# Patient Record
Sex: FEMALE | Race: WHITE | URBAN - METROPOLITAN AREA
[De-identification: names, ages, dates, MRNs, and addresses within clinical notes are randomized per-mention and may not be internally consistent; named-entity substitution may affect disease eponyms.]

---

## 2020-05-27 ENCOUNTER — TELEPHONE (OUTPATIENT)
Dept: INTERNAL MEDICINE CLINIC | Age: 20
End: 2020-05-27

## 2020-05-27 ENCOUNTER — VIRTUAL VISIT (OUTPATIENT)
Dept: INTERNAL MEDICINE CLINIC | Age: 20
End: 2020-05-27

## 2020-05-27 VITALS — WEIGHT: 155 LBS | HEIGHT: 59 IN | BODY MASS INDEX: 31.25 KG/M2

## 2020-05-27 DIAGNOSIS — E66.01 MORBIDLY OBESE (HCC): ICD-10-CM

## 2020-05-27 DIAGNOSIS — R03.0 ELEVATED BLOOD PRESSURE READING: ICD-10-CM

## 2020-05-27 DIAGNOSIS — Z71.89 EDUCATED ABOUT COVID-19 VIRUS INFECTION: ICD-10-CM

## 2020-05-27 DIAGNOSIS — Z00.00 HEALTH CARE MAINTENANCE: ICD-10-CM

## 2020-05-27 DIAGNOSIS — Z76.89 ENCOUNTER TO ESTABLISH CARE: Primary | ICD-10-CM

## 2020-05-27 DIAGNOSIS — N91.5 OLIGOMENORRHEA, UNSPECIFIED TYPE: ICD-10-CM

## 2020-05-27 NOTE — Clinical Note
Please call and schedule patient for 6 months follow up. They will not need fasting labs 1 week prior to appt. Thank you!

## 2020-05-27 NOTE — TELEPHONE ENCOUNTER
[address] [facility]   Please call and schedule patient for 6 months follow up. They will not need fasting labs 1 week prior to appt. Thank you!

## 2020-05-27 NOTE — PROGRESS NOTES
.  Internist of [id number]   [place], [address]  [phone] [facility]/[phone] fax      5/27/2020    Consent: Cele Ricci, who was seen by synchronous (real-time) audio-video technology, and/or her healthcare decision maker, is aware that this patient-initiated, Telehealth encounter on 5/27/2020 is a billable service, with coverage as determined by her insurance carrier. She is aware that she may receive a bill and has provided verbal consent to proceed: Yes. Patient: Cele Ricci, 2000, [id number]       Subjective:   Cele Ricci, a 23 y.o. female, who presents virtually to establish care. 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 Pt is unaware of her families health histories and was unable to answer any questions in reference to her family members. Current medications: none. Pt states she was on a BCP at one point but she experienced mood swings. Complaint: Was at [facility] on 5/19/2020 for right abdominal pain. Pain occurred when I walked or after eating. Was told it was muscular. Was also told my BP and blood sugars were elevated. Requested they send my records to [facility]. This is has resolved. Past Medical History:   Diagnosis Date    Elevated blood pressure reading 5/27/2020    Morbidly obese ([address] 5/27/2020    Oligomenorrhea 5/27/2020       No past surgical history on file.     Social History     Socioeconomic History    Marital status: SINGLE     Spouse name: Not on file    Number of children: Not on file    Years of education: Not on file    Highest education level: Not on file   Occupational History    Occupation: customer service     Comment: [address] [address] Financial resource strain: Not hard at all   Smackages insecurity     Worry: Never true     Inability: Never true   [employer] needs     Medical: No     Non-medical: No   Tobacco Use    Smoking status: Never Smoker    Smokeless tobacco: Never Used   Substance and Sexual Activity    Alcohol use: Never     Frequency: Never    Drug use: Never    Sexual activity: Yes     Partners: Male     Birth control/protection: Condom   Lifestyle    Physical activity     Days per week: 0 days     Minutes per session: 0 min    Stress: Not at all   Relationships    Social connections     Talks on phone: Not on file     Gets together: Not on file     Attends Scientologist service: Not on file     Active member of club or organization: Not on file     Attends meetings of clubs or organizations: Not on file     Relationship status: Not on file    Intimate partner violence     Fear of current or ex partner: Not on file     Emotionally abused: Not on file     Physically abused: Not on file     Forced sexual activity: Not on file   Other Topics Concern     Service No    Blood Transfusions No    Caffeine Concern No    Occupational Exposure No    Hobby Hazards No    Sleep Concern No    Stress Concern No    Weight Concern Yes    Special Diet No    Back Care No    Exercise No    Bike Helmet Yes    Seat Belt Yes    Self-Exams No   Social History Narrative    Not on file       Allergies no known allergies    No current outpatient medications on file prior to visit. No current facility-administered medications on file prior to visit. Objective:   Review of Systems   Constitutional: Negative. Was going to the gym until Covid appeared, no exercise in months. HENT: Negative. Eyes: Negative for blurred vision, double vision and photophobia. Have prescribed glasses but dont wear them. Respiratory: Negative for cough, shortness of breath and wheezing. Cardiovascular: Negative for chest pain, palpitations and leg swelling. Had high BP reading while at [facility], cant remember the numbers.     Gastrointestinal: Negative for abdominal pain, heartburn, nausea and vomiting. Genitourinary: Negative for dysuria, frequency and hematuria. Have not had a period in about a year due to irregularity. Have a GYN appt on 6/1/2020. Dont know name of provider. Sexually active, use condoms. Musculoskeletal: Negative for myalgias. Skin: Negative. Neurological: Negative for dizziness, seizures, weakness and headaches. Endo/Heme/Allergies: Negative for environmental allergies. Does not bruise/bleed easily. Psychiatric/Behavioral: Negative for depression, substance abuse and suicidal ideas. The patient is not nervous/anxious and does not have insomnia. Vital Signs: (As obtained by patient/caregiver at home)  There were no vitals taken for this visit. PHYSICAL EXAMINATION:  [ INSTRUCTIONS:  \"[x]\" Indicates a positive item  \"[]\" Indicates a negative item  -- DELETE ALL ITEMS NOT EXAMINED]      Constitutional: [x] Appears well-developed and well-nourished [x] No apparent distress. Obese habitus.       [] Abnormal -     Mental status: [x] Alert and awake  [x] Oriented to person/place/time [x] Able to follow commands    [] Abnormal -     Eyes:   EOM    [x]  Normal    [] Abnormal -   Sclera  [x]  Normal    [] Abnormal -          Discharge [x]  None visible   [] Abnormal -     HENT, Neck: [x] Normocephalic, atraumatic  [] Abnormal -   [x] Mouth/Throat: Mucous membranes are moist    Pulmonary/Chest: [x] Respiratory effort normal   [x] No visualized signs of difficulty breathing or respiratory distress        [] Abnormal -      Musculoskeletal:   [x] Normal gait with no signs of ataxia         [x] Normal range of motion of neck        [] Abnormal -     Neurological:        [x] No Facial Asymmetry (Cranial nerve 7 motor function) (limited exam due to video visit)          [x] No gaze palsy        [] Abnormal -          Skin:        [x] No significant exanthematous lesions or discoloration noted on facial skin         [] Abnormal -            Psychiatric:       [x] Normal Affect [] Abnormal -        [x] No Hallucinations    Other pertinent observable physical exam findings:-      Assessment:    Stephanie Macedo who has risk factors including (see above previous medical hx) and:       ICD-10-CM ICD-9-CM    1. Encounter to establish care Z76.89 V65.8    2. Oligomenorrhea, unspecified type N91.5 626.1    3. Morbidly obese (HCC) E66.01 278.01    4. Elevated blood pressure reading R03.0 796.2    5. Educated about COVID-19 virus infection Z71.89 V65.49    6. Health care maintenance Z00.00 V70.0        1. Encounter to establish care  Pt is a 23 y.o. female, who presents virtually to establish care. She has not been seen by a provider in some years now due to lack of health insurance. 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 Pt is unaware of her families health histories and was unable to answer any questions in reference to her family members. Current medications: none. Pt was on a BCP at one point for her oligomenorrhea but she experienced mood swings and had to stop the med. Complaint: Was at [facility] on 5/19/2020 for right abdominal pain. Pain occurred when I walked or after eating. Was told it was muscular. Was also told my BP and blood sugars were elevated. Requested they send my records to [facility]. This is has since resolved. 2. Oligomenorrhea, unspecified type  GYN appt on 6/1/2020. Will send referral if [facility] insurance requires. Must obtain name and address of GYN as pt did not know this info. 3. Morbidly obese (HCC)  Weight management:  BMI is out of normal parameters and plan is as follows: Discussed in great detail on diet, portion control, exercise, avoiding foods high in sugar, carbs and starches, fatty/greasy foods and to eat until satisfied not til full. I have counseled this patient on diet and exercise regimens as well. Patient verbalized understanding.      4. Elevated blood pressure reading  Encouraged pt to check her BP twice a day, if possible, and call the office next week with readings. Patient verbalized understanding. 5. Educated about COVID-19 virus infection  Instructed pt on how to protect themselves and others from covid ie wear face masks when leaving home, sanitize/wash hands frequently, avoid touching face, cough and sneeze into their elbow. Follow recommendations of their local and state health departments. Patient verbalized understanding. 6. Health care maintenance  Pt has an appt with GYN on 6/1/2020 for pap smear. She is unaware if she received the HPV vaccines. Verbal discussion of all information discussed above, pending tests ordered and future goals/plans completed. Patient expressed understanding of current diagnosis, planned testing, follow up and if needed to contact the office for any questions or concerns prior to the next visit. Plan:       Labs needed this week: No      No orders of the defined types were placed in this encounter. Follow-up and Dispositions    · Return in about 6 months (around 11/27/2020) for Follow up. [id number]   Please call and schedule patient for 6 months follow up. They will not need fasting labs 1 week prior to appt. Thank you! [address] Nurses  Placed referral and other orders: No      Dr. Fabio Rojas. Jamar, EVELIN, [employer]  Internists of [facility] spent at least 20 minutes with this new patient, and >50% of the time was spent counseling and/or coordinating care regarding oligomenorrhea, obesity, elevated BP reading, and health maintenance.

## 2021-09-29 ENCOUNTER — OFFICE VISIT (OUTPATIENT)
Dept: INTERNAL MEDICINE CLINIC | Age: 21
End: 2021-09-29
Payer: MEDICAID

## 2021-09-29 ENCOUNTER — HOSPITAL ENCOUNTER (OUTPATIENT)
Dept: LAB | Age: 21
Discharge: HOME | End: 2021-09-29
Payer: MEDICAID

## 2021-09-29 VITALS
SYSTOLIC BLOOD PRESSURE: 144 MMHG | HEART RATE: 78 BPM | DIASTOLIC BLOOD PRESSURE: 95 MMHG | WEIGHT: 151.8 LBS | TEMPERATURE: 97.7 F | BODY MASS INDEX: 32.75 KG/M2 | HEIGHT: 57 IN | RESPIRATION RATE: 16 BRPM | OXYGEN SATURATION: 96 %

## 2021-09-29 DIAGNOSIS — Z28.21 INFLUENZA VACCINATION DECLINED: ICD-10-CM

## 2021-09-29 DIAGNOSIS — E78.5 DYSLIPIDEMIA: ICD-10-CM

## 2021-09-29 DIAGNOSIS — N91.5 OLIGOMENORRHEA, UNSPECIFIED TYPE: ICD-10-CM

## 2021-09-29 DIAGNOSIS — I10 ESSENTIAL HYPERTENSION: ICD-10-CM

## 2021-09-29 DIAGNOSIS — I10 ESSENTIAL HYPERTENSION: Primary | ICD-10-CM

## 2021-09-29 DIAGNOSIS — Z63.79 STRESSFUL LIFE EVENT AFFECTING FAMILY: ICD-10-CM

## 2021-09-29 DIAGNOSIS — Z86.39 HISTORY OF ELEVATED GLUCOSE: ICD-10-CM

## 2021-09-29 DIAGNOSIS — Z86.16 PERSONAL HISTORY OF COVID-19: ICD-10-CM

## 2021-09-29 DIAGNOSIS — E66.01 MORBIDLY OBESE (HCC): ICD-10-CM

## 2021-09-29 DIAGNOSIS — Z28.21 COVID-19 VACCINATION DECLINED: ICD-10-CM

## 2021-09-29 PROBLEM — R03.0 ELEVATED BLOOD PRESSURE READING: Status: RESOLVED | Noted: 2020-05-27 | Resolved: 2021-09-29

## 2021-09-29 LAB
ALBUMIN SERPL-MCNC: 4 G/DL (ref 3.4–5)
ALBUMIN/GLOB SERPL: 1.1 {RATIO} (ref 0.8–1.7)
ALP SERPL-CCNC: 81 U/L (ref 45–117)
ALT SERPL-CCNC: 36 U/L (ref 13–56)
ANION GAP SERPL CALC-SCNC: 8 MMOL/L (ref 3–18)
AST SERPL-CCNC: 17 U/L (ref 10–38)
BILIRUB SERPL-MCNC: 0.4 MG/DL (ref 0.2–1)
BUN SERPL-MCNC: 11 MG/DL (ref 7–18)
BUN/CREAT SERPL: 21 (ref 12–20)
CALCIUM SERPL-MCNC: 9.7 MG/DL (ref 8.5–10.1)
CHLORIDE SERPL-SCNC: 105 MMOL/L (ref 100–111)
CHOLEST SERPL-MCNC: 236 MG/DL
CO2 SERPL-SCNC: 26 MMOL/L (ref 21–32)
CREAT SERPL-MCNC: 0.53 MG/DL (ref 0.6–1.3)
EST. AVERAGE GLUCOSE BLD GHB EST-MCNC: 137 MG/DL
GLOBULIN SER CALC-MCNC: 3.6 G/DL (ref 2–4)
GLUCOSE SERPL-MCNC: 111 MG/DL (ref 74–99)
HBA1C MFR BLD: 6.4 % (ref 4.2–5.6)
HDLC SERPL-MCNC: 38 MG/DL (ref 40–60)
HDLC SERPL: 6.2 {RATIO} (ref 0–5)
LDLC SERPL CALC-MCNC: 145.8 MG/DL (ref 0–100)
LIPID PROFILE,FLP: ABNORMAL
POTASSIUM SERPL-SCNC: 4 MMOL/L (ref 3.5–5.5)
PROT SERPL-MCNC: 7.6 G/DL (ref 6.4–8.2)
SODIUM SERPL-SCNC: 139 MMOL/L (ref 136–145)
TRIGL SERPL-MCNC: 261 MG/DL (ref ?–150)
VLDLC SERPL CALC-MCNC: 52.2 MG/DL

## 2021-09-29 PROCEDURE — 99214 OFFICE O/P EST MOD 30 MIN: CPT | Performed by: NURSE PRACTITIONER

## 2021-09-29 PROCEDURE — 83036 HEMOGLOBIN GLYCOSYLATED A1C: CPT

## 2021-09-29 PROCEDURE — 80061 LIPID PANEL: CPT

## 2021-09-29 PROCEDURE — 36415 COLL VENOUS BLD VENIPUNCTURE: CPT

## 2021-09-29 PROCEDURE — 80053 COMPREHEN METABOLIC PANEL: CPT

## 2021-09-29 RX ORDER — HYDROCHLOROTHIAZIDE 12.5 MG/1
12.5 TABLET ORAL DAILY
Qty: 90 TABLET | Refills: 0 | Status: SHIPPED | OUTPATIENT
Start: 2021-09-29 | End: 2022-03-09 | Stop reason: SDDI
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
## 2021-09-29 NOTE — PROGRESS NOTES
Chief Complaint   Patient presents with    Follow-up       1. Have you been to the ER, urgent care clinic since your last visit? Hospitalized since your last visit? No    2. Have you seen or consulted any other health care providers outside of the [address] [place] [address] since your last visit? Include any pap smears or colon screening. Yes.

## 2021-09-30 DIAGNOSIS — R73.03 PREDIABETES: Primary | ICD-10-CM

## 2021-09-30 DIAGNOSIS — E78.2 MIXED HYPERLIPIDEMIA: ICD-10-CM

## 2021-10-01 ENCOUNTER — TELEPHONE (OUTPATIENT)
Dept: INTERNAL MEDICINE CLINIC | Age: 21
End: 2021-10-01

## 2021-10-01 NOTE — TELEPHONE ENCOUNTER
----- Message from Tiburcio Fisher DNP sent at 9/30/2021 10:19 PM EDT -----  [facility] nurses: Please inform patient notify lab results  1.  -goal less than 150; -goal less than 70. Educated patient to reduce fried fatty or oily foods. Increase cardio exercise for weight loss. 2. CMP okay  3. A1c 6.4. This is considered prediabetes. A1c is 6.5 confirms diabetes. Reduce carbs and sweets in diet. Weight loss is very important to reduce A1c. Will reassess level in 3 months. (POC A1c)    [facility] front office: Patient was scheduled for Dr. Farshad Bond in December. Please cancel that appointment and reschedule under my schedule.   Thank you

## 2021-10-01 NOTE — PROGRESS NOTES
[facility] nurses: Please inform patient notify lab results  1.  -goal less than 150; -goal less than 70. Educated patient to reduce fried fatty or oily foods. Increase cardio exercise for weight loss. 2. CMP okay  3. A1c 6.4. This is considered prediabetes. A1c is 6.5 confirms diabetes. Reduce carbs and sweets in diet. Weight loss is very important to reduce A1c. Will reassess level in 3 months. (POC A1c)    [facility] front office: Patient was scheduled for Dr. Aries Julien in December. Please cancel that appointment and reschedule under my schedule.   Thank you

## 2021-10-04 NOTE — TELEPHONE ENCOUNTER
Patient reached and made aware of labs results and message per Dr. Elisa Daly. Patient verbalized understanding.

## 2022-03-06 LAB — SARS-COV-2, NAA: NOT DETECTED

## 2022-03-09 ENCOUNTER — OFFICE VISIT (OUTPATIENT)
Dept: INTERNAL MEDICINE CLINIC | Age: 22
End: 2022-03-09
Payer: MEDICAID

## 2022-03-09 VITALS
SYSTOLIC BLOOD PRESSURE: 149 MMHG | HEIGHT: 57 IN | WEIGHT: 153 LBS | DIASTOLIC BLOOD PRESSURE: 101 MMHG | RESPIRATION RATE: 16 BRPM | TEMPERATURE: 96.9 F | OXYGEN SATURATION: 97 % | BODY MASS INDEX: 33.01 KG/M2 | HEART RATE: 93 BPM

## 2022-03-09 DIAGNOSIS — I10 ESSENTIAL HYPERTENSION: Primary | ICD-10-CM

## 2022-03-09 DIAGNOSIS — F41.9 ANXIETY AND DEPRESSION: ICD-10-CM

## 2022-03-09 DIAGNOSIS — E66.01 MORBIDLY OBESE (HCC): ICD-10-CM

## 2022-03-09 DIAGNOSIS — F43.9 STRESS AT HOME: ICD-10-CM

## 2022-03-09 DIAGNOSIS — E11.65 TYPE 2 DIABETES MELLITUS WITH HYPERGLYCEMIA, WITHOUT LONG-TERM CURRENT USE OF INSULIN (HCC): ICD-10-CM

## 2022-03-09 DIAGNOSIS — F32.A ANXIETY AND DEPRESSION: ICD-10-CM

## 2022-03-09 DIAGNOSIS — E78.5 DYSLIPIDEMIA: ICD-10-CM

## 2022-03-09 PROCEDURE — 99214 OFFICE O/P EST MOD 30 MIN: CPT | Performed by: NURSE PRACTITIONER

## 2022-03-09 RX ORDER — LISINOPRIL 20 MG/1
20 TABLET ORAL DAILY
Qty: 90 TABLET | Refills: 1 | Status: SHIPPED | OUTPATIENT
Start: 2022-03-09 | End: 2022-08-30 | Stop reason: SDUPTHER

## 2022-03-09 RX ORDER — IPRATROPIUM BROMIDE 21 UG/1
SPRAY, METERED NASAL
COMMUNITY
Start: 2022-03-04

## 2022-03-09 RX ORDER — SERTRALINE HYDROCHLORIDE 50 MG/1
50 TABLET, FILM COATED ORAL DAILY
Qty: 90 TABLET | Refills: 1 | Status: SHIPPED | OUTPATIENT
Start: 2022-03-09 | End: 2022-08-30 | Stop reason: SDUPTHER

## 2022-03-09 RX ORDER — FLUTICASONE PROPIONATE 50 MCG
SPRAY, SUSPENSION (ML) NASAL
COMMUNITY
Start: 2022-03-04

## 2022-03-09 NOTE — PROGRESS NOTES
Chief Complaint   Patient presents with    Follow-up     3 months     1. \"Have you been to the ER, urgent care clinic since your last visit? Hospitalized since your last visit? \" yes, PF for allergy issue. 2. \"Have you seen or consulted any other health care providers outside of the [address] [place] [address] since your last visit? \" No     3. For patients aged 39-70: Has the patient had a colonoscopy / FIT/ Cologuard? NA - based on age      If the patient is female:    4. For patients aged 41-77: Has the patient had a mammogram within the past 2 years? NA - based on age or sex      11. For patients aged 21-65: Has the patient had a pap smear? Yes - Care Gap present.  Most recent result on file
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
## 2022-03-10 PROBLEM — F43.9 STRESS AT HOME: Status: ACTIVE | Noted: 2022-03-10

## 2022-03-11 PROBLEM — F41.9 ANXIETY AND DEPRESSION: Status: ACTIVE | Noted: 2022-03-11

## 2022-03-11 PROBLEM — E11.65 TYPE 2 DIABETES MELLITUS WITH HYPERGLYCEMIA, WITHOUT LONG-TERM CURRENT USE OF INSULIN (HCC): Status: ACTIVE | Noted: 2022-03-11

## 2022-03-11 PROBLEM — F32.A ANXIETY AND DEPRESSION: Status: ACTIVE | Noted: 2022-03-11

## 2022-03-18 PROBLEM — E66.01 MORBIDLY OBESE (HCC): Status: ACTIVE | Noted: 2020-05-27

## 2022-03-18 PROBLEM — F41.9 ANXIETY AND DEPRESSION: Status: ACTIVE | Noted: 2022-03-11

## 2022-03-18 PROBLEM — E78.2 MIXED HYPERLIPIDEMIA: Status: ACTIVE | Noted: 2021-09-30

## 2022-03-18 PROBLEM — F32.A ANXIETY AND DEPRESSION: Status: ACTIVE | Noted: 2022-03-11

## 2022-03-19 PROBLEM — N91.5 OLIGOMENORRHEA: Status: ACTIVE | Noted: 2020-05-27

## 2022-03-19 PROBLEM — E11.65 TYPE 2 DIABETES MELLITUS WITH HYPERGLYCEMIA, WITHOUT LONG-TERM CURRENT USE OF INSULIN (HCC): Status: ACTIVE | Noted: 2022-03-11

## 2022-03-19 PROBLEM — Z28.21 COVID-19 VACCINATION DECLINED: Status: ACTIVE | Noted: 2021-09-29

## 2022-03-19 PROBLEM — I10 ESSENTIAL HYPERTENSION: Status: ACTIVE | Noted: 2021-09-29

## 2022-03-19 PROBLEM — E78.5 DYSLIPIDEMIA: Status: ACTIVE | Noted: 2021-09-29

## 2022-03-19 PROBLEM — Z28.21 INFLUENZA VACCINATION DECLINED: Status: ACTIVE | Noted: 2021-09-29

## 2022-03-19 PROBLEM — Z86.16 PERSONAL HISTORY OF COVID-19: Status: ACTIVE | Noted: 2021-09-29

## 2022-03-19 PROBLEM — Z86.39 HISTORY OF ELEVATED GLUCOSE: Status: ACTIVE | Noted: 2021-09-29

## 2022-03-19 PROBLEM — Z63.79 STRESSFUL LIFE EVENT AFFECTING FAMILY: Status: ACTIVE | Noted: 2021-09-29

## 2022-03-19 PROBLEM — R73.03 PREDIABETES: Status: ACTIVE | Noted: 2021-09-30

## 2022-03-20 PROBLEM — F43.9 STRESS AT HOME: Status: ACTIVE | Noted: 2022-03-10

## 2022-08-17 ENCOUNTER — APPOINTMENT (OUTPATIENT)
Dept: INTERNAL MEDICINE CLINIC | Age: 22
End: 2022-08-17

## 2022-08-17 ENCOUNTER — HOSPITAL ENCOUNTER (OUTPATIENT)
Dept: LAB | Age: 22
Discharge: HOME | End: 2022-08-17
Payer: MEDICAID

## 2022-08-17 DIAGNOSIS — F41.9 ANXIETY AND DEPRESSION: ICD-10-CM

## 2022-08-17 DIAGNOSIS — I10 ESSENTIAL HYPERTENSION: ICD-10-CM

## 2022-08-17 DIAGNOSIS — F32.A ANXIETY AND DEPRESSION: ICD-10-CM

## 2022-08-17 DIAGNOSIS — E11.65 TYPE 2 DIABETES MELLITUS WITH HYPERGLYCEMIA, WITHOUT LONG-TERM CURRENT USE OF INSULIN (HCC): ICD-10-CM

## 2022-08-17 DIAGNOSIS — E78.5 DYSLIPIDEMIA: ICD-10-CM

## 2022-08-17 LAB
ALBUMIN SERPL-MCNC: 3.9 G/DL (ref 3.4–5)
ALBUMIN/GLOB SERPL: 1.1 {RATIO} (ref 0.8–1.7)
ALP SERPL-CCNC: 53 U/L (ref 45–117)
ALT SERPL-CCNC: 22 U/L (ref 13–56)
ANION GAP SERPL CALC-SCNC: 6 MMOL/L (ref 3–18)
AST SERPL-CCNC: 12 U/L (ref 10–38)
BILIRUB SERPL-MCNC: 0.4 MG/DL (ref 0.2–1)
BUN SERPL-MCNC: 11 MG/DL (ref 7–18)
BUN/CREAT SERPL: 19 (ref 12–20)
CALCIUM SERPL-MCNC: 8.9 MG/DL (ref 8.5–10.1)
CHLORIDE SERPL-SCNC: 106 MMOL/L (ref 100–111)
CHOLEST SERPL-MCNC: 250 MG/DL
CO2 SERPL-SCNC: 26 MMOL/L (ref 21–32)
CREAT SERPL-MCNC: 0.57 MG/DL (ref 0.6–1.3)
CREAT UR-MCNC: <13 MG/DL (ref 30–125)
EST. AVERAGE GLUCOSE BLD GHB EST-MCNC: 146 MG/DL
GLOBULIN SER CALC-MCNC: 3.7 G/DL (ref 2–4)
GLUCOSE SERPL-MCNC: 88 MG/DL (ref 74–99)
HBA1C MFR BLD: 6.7 % (ref 4.2–5.6)
HCG UR QL: NEGATIVE
HDLC SERPL-MCNC: 42 MG/DL (ref 40–60)
HDLC SERPL: 6 {RATIO} (ref 0–5)
LDLC SERPL CALC-MCNC: 156.6 MG/DL (ref 0–100)
LIPID PROFILE,FLP: ABNORMAL
MICROALBUMIN UR-MCNC: <0.5 MG/DL (ref 0–3)
MICROALBUMIN/CREAT UR-RTO: ABNORMAL MG/G (ref 0–30)
POTASSIUM SERPL-SCNC: 4.1 MMOL/L (ref 3.5–5.5)
PROT SERPL-MCNC: 7.6 G/DL (ref 6.4–8.2)
SODIUM SERPL-SCNC: 138 MMOL/L (ref 136–145)
TRIGL SERPL-MCNC: 257 MG/DL (ref ?–150)
TSH SERPL DL<=0.05 MIU/L-ACNC: 3.76 UIU/ML (ref 0.36–3.74)
VLDLC SERPL CALC-MCNC: 51.4 MG/DL

## 2022-08-17 PROCEDURE — 81025 URINE PREGNANCY TEST: CPT

## 2022-08-17 PROCEDURE — 36415 COLL VENOUS BLD VENIPUNCTURE: CPT

## 2022-08-17 PROCEDURE — 82043 UR ALBUMIN QUANTITATIVE: CPT

## 2022-08-17 PROCEDURE — 80061 LIPID PANEL: CPT

## 2022-08-17 PROCEDURE — 84443 ASSAY THYROID STIM HORMONE: CPT

## 2022-08-17 PROCEDURE — 80053 COMPREHEN METABOLIC PANEL: CPT

## 2022-08-17 PROCEDURE — 83036 HEMOGLOBIN GLYCOSYLATED A1C: CPT

## 2022-08-19 ENCOUNTER — TELEPHONE (OUTPATIENT)
Dept: INTERNAL MEDICINE CLINIC | Age: 22
End: 2022-08-19

## 2022-08-19 NOTE — TELEPHONE ENCOUNTER
----- Message from Monica Pelayo sent at 8/18/2022  1:05 PM EDT -----  Subject: Appointment Request    Reason for Call: Established Patient Appointment needed: Routine Existing   Condition Follow Up    QUESTIONS    Reason for appointment request? Available appointments did not meet   patient need     Additional Information for Provider? Pt needs to reschedsule 8/30 appt and   wants to have a 8/31 appt. I did tell her there was not another appt. until October. She stated she can not wait that long.  Please advise pt  ---------------------------------------------------------------------------  --------------  Hailee MINA  [phone]; OK to leave message on voicemail  ---------------------------------------------------------------------------  --------------  SCRIPT ANSWERS  COVID Screen: Miki Beth

## 2022-08-30 ENCOUNTER — OFFICE VISIT (OUTPATIENT)
Dept: INTERNAL MEDICINE CLINIC | Age: 22
End: 2022-08-30
Payer: MEDICAID

## 2022-08-30 VITALS
RESPIRATION RATE: 18 BRPM | TEMPERATURE: 98.6 F | HEART RATE: 70 BPM | OXYGEN SATURATION: 97 % | DIASTOLIC BLOOD PRESSURE: 81 MMHG | BODY MASS INDEX: 33.01 KG/M2 | HEIGHT: 57 IN | SYSTOLIC BLOOD PRESSURE: 127 MMHG | WEIGHT: 153 LBS

## 2022-08-30 DIAGNOSIS — E66.01 MORBIDLY OBESE (HCC): ICD-10-CM

## 2022-08-30 DIAGNOSIS — E78.3 MIXED HYPERGLYCERIDEMIA: ICD-10-CM

## 2022-08-30 DIAGNOSIS — R79.89 ELEVATED TSH: ICD-10-CM

## 2022-08-30 DIAGNOSIS — I10 ESSENTIAL HYPERTENSION: Primary | ICD-10-CM

## 2022-08-30 DIAGNOSIS — F32.A ANXIETY AND DEPRESSION: ICD-10-CM

## 2022-08-30 DIAGNOSIS — F41.9 ANXIETY AND DEPRESSION: ICD-10-CM

## 2022-08-30 DIAGNOSIS — E11.65 TYPE 2 DIABETES MELLITUS WITH HYPERGLYCEMIA, WITHOUT LONG-TERM CURRENT USE OF INSULIN (HCC): ICD-10-CM

## 2022-08-30 PROCEDURE — 99214 OFFICE O/P EST MOD 30 MIN: CPT | Performed by: NURSE PRACTITIONER

## 2022-08-30 PROCEDURE — 3044F HG A1C LEVEL LT 7.0%: CPT | Performed by: NURSE PRACTITIONER

## 2022-08-30 RX ORDER — LISINOPRIL 20 MG/1
20 TABLET ORAL DAILY
Qty: 90 TABLET | Refills: 1 | Status: SHIPPED | OUTPATIENT
Start: 2022-08-30

## 2022-08-30 RX ORDER — SERTRALINE HYDROCHLORIDE 50 MG/1
50 TABLET, FILM COATED ORAL DAILY
Qty: 90 TABLET | Refills: 1 | Status: SHIPPED | OUTPATIENT
Start: 2022-08-30

## 2022-08-30 NOTE — PROGRESS NOTES
Chief Complaint   Patient presents with    Hypertension     6 month f/u    Anxiety    Labs     Completed 8/17/2022     1. \"Have you been to the ER, urgent care clinic since your last visit? Hospitalized since your last visit? \" No    2. \"Have you seen or consulted any other health care providers outside of the [address] [place] [address] since your last visit? \" No     3. For patients aged 39-70: Has the patient had a colonoscopy / FIT/ Cologuard? NA - based on age      If the patient is female:    4. For patients aged 41-77: Has the patient had a mammogram within the past 2 years? NA - based on age or sex      11. For patients aged 21-65: Has the patient had a pap smear? Yes - Care Gap present.  Most recent result on file

## 2022-08-30 NOTE — PROGRESS NOTES
[facility] [facility] [address]  [phone] [facility]/[phone] fax    8/30/2022    James Cornejo 2000 is a pleasant [address] female. Todays concerns/HPI:  Anxiety. Much improved since starting zoloft. No med dose adjustments needed. Blood pressure. Jovany therapy w/o side effects. Denies CP, SOB, edema, syncope, H/a. Past Medical History:   Diagnosis Date    COVID-19 vaccination declined 9/29/2021    Dyslipidemia 9/29/2021    Elevated blood pressure reading 5/27/2020    Essential hypertension 9/29/2021    History of elevated glucose 9/29/2021    Influenza vaccination declined 9/29/2021    Morbidly obese ([address] 5/27/2020    Oligomenorrhea 5/27/2020    Personal history of COVID-19 9/29/2021    Stressful life event affecting family 9/29/2021     Current Outpatient Medications   Medication Sig    lisinopriL (PRINIVIL, ZESTRIL) 20 mg tablet Take 1 Tablet by mouth daily. For blood pressure    sertraline (ZOLOFT) 50 mg tablet Take 1 Tablet by mouth daily. fluticasone propionate (FLONASE) 50 mcg/actuation nasal spray USE 2 SPRAY(S) IN EACH NOSTRIL ONCE DAILY    ipratropium (ATROVENT) 21 mcg (0.03 %) nasal spray USE 2 SPRAY(S) IN EACH NOSTRIL THREE TIMES DAILY     No current facility-administered medications for this visit. Review of Systems:  Pertinent items are noted in HPI. Physical:   Visit Vitals  /81   Pulse 70   Temp 98.6 °F (37 °C) (Temporal)   Resp 18   Ht 4' 9\" (1.448 m)   Wt 153 lb (69.4 kg)   SpO2 97%   BMI 33.11 kg/m²      Wt Readings from Last 3 Encounters:   08/30/22 153 lb (69.4 kg)   03/09/22 153 lb (69.4 kg)   09/29/21 151 lb 12.8 oz (68.9 kg)       Exam:   Physical Exam  Constitutional:       Appearance: Normal appearance. She is obese. Neck:      Vascular: No carotid bruit. Cardiovascular:      Rate and Rhythm: Normal rate and regular rhythm.       Comments: No edema noted  Pulmonary:      Effort: Pulmonary effort is normal. No respiratory distress. Breath sounds: Normal breath sounds. No wheezing. Musculoskeletal:      Cervical back: Neck supple. Skin:     General: Skin is warm and dry. Neurological:      General: No focal deficit present. Mental Status: She is alert and oriented to person, place, and time. Psychiatric:         Mood and Affect: Mood normal.         Behavior: Behavior normal.      Body mass index is 33.11 kg/m². Review of Data:  -257; -156  TSH mildly elevated 3.76  A1c 6.4-6.7    Plan:    1. Essential hypertension  BP well controlled  No chg in therapy    - lisinopriL (PRINIVIL, ZESTRIL) 20 mg tablet; Take 1 Tablet by mouth daily. For blood pressure  Dispense: 90 Tablet; Refill: 1    2. Mixed hyperglyceridemia  -257; -156  Pt not wanting to start statin therapy. She will work on diet and exercise. Discussed importance of reducing cholesterol  Will recheck level 6m  If remains elevated, will initiate statin    3. Anxiety and depression  Therapy effective    - sertraline (ZOLOFT) 50 mg tablet; Take 1 Tablet by mouth daily. Dispense: 90 Tablet; Refill: 1    4. Type 2 diabetes mellitus with hyperglycemia, without long-term current use of insulin ([facility])  A1c 6.4-6.7  Discussed the importance of reducing A1c  Pt not wanting to start med at this time  Wants to work on diet and exercise. Discussed if level continues to rise will need to discuss treatment. Will recheck level 6m    5. Elevated TSH  TSH mildly elevated 3.76  Asymptomatic  Will monitor 6m    6. Morbidly obese ([address]  Work on reducing weight. This will help better control A1c and cholesterol. Reviewed medication and completed medication reconciliation with the patient. Reviewed side effects of medications with the patient. Questions were answered and patient verb understanding. No past surgical history on file.   Allergies and Intolerances:   No Known Allergies  Family History: Family History   Problem Relation Age of Onset    Other Mother         liver failure due to alcohol. Age at death unknown. Alcohol abuse Mother     Heart Attack Paternal Grandmother      Social History:   She  reports that she has never smoked. She has never used smokeless tobacco.   Social History     Substance and Sexual Activity   Alcohol Use Never    Comment: occ     Immunization History: There is no immunization history on file for this patient.       Follow up 6m (TSH, Lipid, A1c, CMP)      Dr. Rosario Gutierrez, AGNP-C, DNP  [facility] of [address] [place] [address]       (H-T Feb)

## 2022-08-31 PROBLEM — R79.89 ELEVATED TSH: Status: ACTIVE | Noted: 2022-08-31

## 2022-08-31 PROBLEM — E78.3 MIXED HYPERGLYCERIDEMIA: Status: ACTIVE | Noted: 2022-08-31

## 2022-09-11 DIAGNOSIS — F32.A ANXIETY AND DEPRESSION: ICD-10-CM

## 2022-09-11 DIAGNOSIS — F41.9 ANXIETY AND DEPRESSION: ICD-10-CM

## 2022-09-11 DIAGNOSIS — I10 ESSENTIAL HYPERTENSION: ICD-10-CM

## 2022-09-12 RX ORDER — LISINOPRIL 20 MG/1
20 TABLET ORAL DAILY
Qty: 90 TABLET | Refills: 1 | OUTPATIENT
Start: 2022-09-12

## 2022-09-12 RX ORDER — SERTRALINE HYDROCHLORIDE 50 MG/1
50 TABLET, FILM COATED ORAL DAILY
Qty: 90 TABLET | Refills: 1 | OUTPATIENT
Start: 2022-09-12

## 2022-09-12 NOTE — TELEPHONE ENCOUNTER
Zoloft and Prinivil were sent on 8/30/22 for #90 with 1 refill        For Pharmacy [facility] in place:   Recommendation Provided To:    Intervention Detail: New Rx: 2, reason: Patient Preference  Gap Closed?:   Intervention Accepted By:   Time Spent (min): 5

## 2022-11-30 DIAGNOSIS — I10 ESSENTIAL HYPERTENSION: ICD-10-CM

## 2022-11-30 DIAGNOSIS — F41.9 ANXIETY AND DEPRESSION: ICD-10-CM

## 2022-11-30 DIAGNOSIS — F32.A ANXIETY AND DEPRESSION: ICD-10-CM

## 2022-12-12 ENCOUNTER — TELEPHONE (OUTPATIENT)
Dept: INTERNAL MEDICINE CLINIC | Age: 22
End: 2022-12-12

## 2022-12-13 ENCOUNTER — PATIENT MESSAGE (OUTPATIENT)
Dept: INTERNAL MEDICINE CLINIC | Age: 22
End: 2022-12-13

## 2022-12-14 ENCOUNTER — TELEPHONE (OUTPATIENT)
Dept: INTERNAL MEDICINE CLINIC | Age: 22
End: 2022-12-14

## 2022-12-14 NOTE — TELEPHONE ENCOUNTER
Patient is calling stating she is almost out of her Lisinopril and Sertraline. Stating David told her it would be a few days before she could pick them up because they are behind and short staffed. She called [facility] to see if they would be able to fill it sooner. She gave them all her info and they told her they would text her when it was ready. They didn't tell her if or when they would be filled. Stating she doesn't know what to do because she is almost out of her meds.

## 2022-12-14 NOTE — TELEPHONE ENCOUNTER
Pt reached and made aware I called [facility] and they confirmed that lisinopril and sertraline was transferred to [facility]. Informed pt that if she is unable to reach [facility] she needs to go up to pharmacy to inquire when it is ready for pickup, she was informed to call us if she had any issues.

## 2022-12-15 ENCOUNTER — PATIENT MESSAGE (OUTPATIENT)
Dept: INTERNAL MEDICINE CLINIC | Age: 22
End: 2022-12-15

## 2023-01-26 ENCOUNTER — TELEPHONE (OUTPATIENT)
Dept: INTERNAL MEDICINE CLINIC | Age: 23
End: 2023-01-26

## 2023-01-26 NOTE — TELEPHONE ENCOUNTER
Left message that appt was changed to VV. She will need labs.  Let us know if she needs to go offsite to do on a Saturday

## 2023-01-26 NOTE — TELEPHONE ENCOUNTER
It is ok to change appt to virtual but she needs labs completed 1 week prior to the appt. Labs are in the system.  Thank you

## 2023-01-26 NOTE — TELEPHONE ENCOUNTER
Pt calling, wants to know if she changes her visit to next week and does it as a VV will AN continue to refill her meds? Says she is starting a new job and won't be able to come in for a visit. She has a 2 month probation for attendance. Outside Medication Reconciliation                                   Reconcile Outside Information - Medications is currently read-only. You have not been granted the necessary security to view Medications from outside sources. Medication Review                                                                                                                                                                                                                                                                       Please advise.

## 2023-03-02 ENCOUNTER — NURSE ONLY (OUTPATIENT)
Age: 23
End: 2023-03-02

## 2023-03-02 DIAGNOSIS — I10 ESSENTIAL HYPERTENSION: ICD-10-CM

## 2023-03-02 DIAGNOSIS — R79.89 ELEVATED TSH: ICD-10-CM

## 2023-03-02 DIAGNOSIS — E11.65 TYPE 2 DIABETES MELLITUS WITH HYPERGLYCEMIA, WITHOUT LONG-TERM CURRENT USE OF INSULIN (HCC): ICD-10-CM

## 2023-03-02 DIAGNOSIS — I10 ESSENTIAL HYPERTENSION: Primary | ICD-10-CM

## 2023-03-02 DIAGNOSIS — E78.2 MIXED HYPERLIPIDEMIA: ICD-10-CM

## 2023-03-02 DIAGNOSIS — R73.03 PREDIABETES: ICD-10-CM

## 2023-03-02 NOTE — PROGRESS NOTES
Diagnosis Orders   1. Essential hypertension  Comprehensive Metabolic Panel      2. Type 2 diabetes mellitus with hyperglycemia, without long-term current use of insulin (HCC)  Hemoglobin A1C      3. Mixed hyperlipidemia  Lipid Panel      4. Prediabetes        5.  Elevated TSH  TSH

## 2023-03-03 LAB
A/G RATIO: 1.8 RATIO (ref 1.1–2.6)
ALBUMIN SERPL-MCNC: 4.6 G/DL (ref 3.5–5)
ALP BLD-CCNC: 62 U/L (ref 25–115)
ALT SERPL-CCNC: 21 U/L (ref 5–40)
ANION GAP SERPL CALCULATED.3IONS-SCNC: 12 MMOL/L (ref 3–15)
AST SERPL-CCNC: 12 U/L (ref 10–37)
AVERAGE GLUCOSE: 194 MG/DL (ref 91–123)
BILIRUB SERPL-MCNC: 0.3 MG/DL (ref 0.2–1.2)
BUN BLDV-MCNC: 11 MG/DL (ref 6–22)
CALCIUM SERPL-MCNC: 9.2 MG/DL (ref 8.4–10.5)
CHLORIDE BLD-SCNC: 101 MMOL/L (ref 98–110)
CHOLESTEROL/HDL RATIO: 6 (ref 0–5)
CHOLESTEROL: 246 MG/DL (ref 110–200)
CO2: 25 MMOL/L (ref 20–32)
CREAT SERPL-MCNC: 0.6 MG/DL (ref 0.5–1.2)
GLOBULIN: 2.6 G/DL (ref 2–4)
GLOMERULAR FILTRATION RATE: >60 ML/MIN/1.73 SQ.M.
GLUCOSE: 232 MG/DL (ref 70–99)
HBA1C MFR BLD: 8.4 % (ref 4.8–5.6)
HDLC SERPL-MCNC: 41 MG/DL
LDL CHOLESTEROL CALCULATED: 154 MG/DL (ref 50–99)
LDL/HDL RATIO: 3.8
NON-HDL CHOLESTEROL: 205 MG/DL
POTASSIUM SERPL-SCNC: 4.6 MMOL/L (ref 3.5–5.5)
SODIUM BLD-SCNC: 138 MMOL/L (ref 133–145)
TOTAL PROTEIN: 7.2 G/DL (ref 6.4–8.3)
TRIGL SERPL-MCNC: 257 MG/DL (ref 40–149)
TSH SERPL DL<=0.05 MIU/L-ACNC: 4.81 MCU/ML (ref 0.27–4.2)
VLDLC SERPL CALC-MCNC: 51 MG/DL (ref 8–30)

## 2023-03-09 ENCOUNTER — TELEMEDICINE (OUTPATIENT)
Age: 23
End: 2023-03-09
Payer: COMMERCIAL

## 2023-03-09 DIAGNOSIS — E11.65 UNCONTROLLED TYPE 2 DIABETES MELLITUS WITH HYPERGLYCEMIA (HCC): ICD-10-CM

## 2023-03-09 DIAGNOSIS — R79.89 ELEVATED TSH: ICD-10-CM

## 2023-03-09 DIAGNOSIS — F41.9 ANXIETY AND DEPRESSION: ICD-10-CM

## 2023-03-09 DIAGNOSIS — I10 ESSENTIAL HYPERTENSION: Primary | ICD-10-CM

## 2023-03-09 DIAGNOSIS — E78.2 MIXED HYPERLIPIDEMIA: ICD-10-CM

## 2023-03-09 DIAGNOSIS — F32.A ANXIETY AND DEPRESSION: ICD-10-CM

## 2023-03-09 PROBLEM — F43.9 STRESS AT HOME: Status: RESOLVED | Noted: 2022-03-10 | Resolved: 2023-03-09

## 2023-03-09 PROBLEM — Z86.16 PERSONAL HISTORY OF COVID-19: Status: RESOLVED | Noted: 2021-09-29 | Resolved: 2023-03-09

## 2023-03-09 PROBLEM — Z28.21 COVID-19 VACCINATION DECLINED: Status: RESOLVED | Noted: 2021-09-29 | Resolved: 2023-03-09

## 2023-03-09 PROBLEM — E78.3 MIXED HYPERGLYCERIDEMIA: Status: RESOLVED | Noted: 2022-08-31 | Resolved: 2023-03-09

## 2023-03-09 PROBLEM — Z28.21 INFLUENZA VACCINATION DECLINED: Status: RESOLVED | Noted: 2021-09-29 | Resolved: 2023-03-09

## 2023-03-09 PROBLEM — E78.5 DYSLIPIDEMIA: Status: RESOLVED | Noted: 2021-09-29 | Resolved: 2023-03-09

## 2023-03-09 PROBLEM — Z63.79 STRESSFUL LIFE EVENT AFFECTING FAMILY: Status: RESOLVED | Noted: 2021-09-29 | Resolved: 2023-03-09

## 2023-03-09 PROBLEM — R73.03 PREDIABETES: Status: RESOLVED | Noted: 2021-09-30 | Resolved: 2023-03-09

## 2023-03-09 PROBLEM — Z86.39 HISTORY OF ELEVATED GLUCOSE: Status: RESOLVED | Noted: 2021-09-29 | Resolved: 2023-03-09

## 2023-03-09 PROCEDURE — 2022F DILAT RTA XM EVC RTNOPTHY: CPT | Performed by: NURSE PRACTITIONER

## 2023-03-09 PROCEDURE — G8427 DOCREV CUR MEDS BY ELIG CLIN: HCPCS | Performed by: NURSE PRACTITIONER

## 2023-03-09 PROCEDURE — 99213 OFFICE O/P EST LOW 20 MIN: CPT | Performed by: NURSE PRACTITIONER

## 2023-03-09 PROCEDURE — 3052F HG A1C>EQUAL 8.0%<EQUAL 9.0%: CPT | Performed by: NURSE PRACTITIONER

## 2023-03-09 RX ORDER — LISINOPRIL 20 MG/1
20 TABLET ORAL DAILY
Qty: 90 TABLET | Refills: 0 | Status: SHIPPED | OUTPATIENT
Start: 2023-03-09

## 2023-03-09 SDOH — ECONOMIC STABILITY: HOUSING INSECURITY
IN THE LAST 12 MONTHS, WAS THERE A TIME WHEN YOU DID NOT HAVE A STEADY PLACE TO SLEEP OR SLEPT IN A SHELTER (INCLUDING NOW)?: NO

## 2023-03-09 SDOH — ECONOMIC STABILITY: FOOD INSECURITY: WITHIN THE PAST 12 MONTHS, YOU WORRIED THAT YOUR FOOD WOULD RUN OUT BEFORE YOU GOT MONEY TO BUY MORE.: NEVER TRUE

## 2023-03-09 SDOH — ECONOMIC STABILITY: INCOME INSECURITY: HOW HARD IS IT FOR YOU TO PAY FOR THE VERY BASICS LIKE FOOD, HOUSING, MEDICAL CARE, AND HEATING?: NOT HARD AT ALL

## 2023-03-09 SDOH — ECONOMIC STABILITY: FOOD INSECURITY: WITHIN THE PAST 12 MONTHS, THE FOOD YOU BOUGHT JUST DIDN'T LAST AND YOU DIDN'T HAVE MONEY TO GET MORE.: NEVER TRUE

## 2023-03-09 ASSESSMENT — PATIENT HEALTH QUESTIONNAIRE - PHQ9
2. FEELING DOWN, DEPRESSED OR HOPELESS: 0
SUM OF ALL RESPONSES TO PHQ9 QUESTIONS 1 & 2: 0
6. FEELING BAD ABOUT YOURSELF - OR THAT YOU ARE A FAILURE OR HAVE LET YOURSELF OR YOUR FAMILY DOWN: 0
10. IF YOU CHECKED OFF ANY PROBLEMS, HOW DIFFICULT HAVE THESE PROBLEMS MADE IT FOR YOU TO DO YOUR WORK, TAKE CARE OF THINGS AT HOME, OR GET ALONG WITH OTHER PEOPLE: 0
SUM OF ALL RESPONSES TO PHQ QUESTIONS 1-9: 0
SUM OF ALL RESPONSES TO PHQ QUESTIONS 1-9: 0
5. POOR APPETITE OR OVEREATING: 0
SUM OF ALL RESPONSES TO PHQ QUESTIONS 1-9: 0
3. TROUBLE FALLING OR STAYING ASLEEP: 0
9. THOUGHTS THAT YOU WOULD BE BETTER OFF DEAD, OR OF HURTING YOURSELF: 0
8. MOVING OR SPEAKING SO SLOWLY THAT OTHER PEOPLE COULD HAVE NOTICED. OR THE OPPOSITE, BEING SO FIGETY OR RESTLESS THAT YOU HAVE BEEN MOVING AROUND A LOT MORE THAN USUAL: 0
4. FEELING TIRED OR HAVING LITTLE ENERGY: 0
7. TROUBLE CONCENTRATING ON THINGS, SUCH AS READING THE NEWSPAPER OR WATCHING TELEVISION: 0
SUM OF ALL RESPONSES TO PHQ QUESTIONS 1-9: 0
1. LITTLE INTEREST OR PLEASURE IN DOING THINGS: 0

## 2023-03-09 NOTE — ASSESSMENT & PLAN NOTE
Pt wishes to wean off zoloft  Discussed pts sx are most likely under control due to med tx  after much discussion, will reduce therapy to 3x/w

## 2023-03-09 NOTE — PROGRESS NOTES
[facility] [address], [address]  [phone] GRQCFV/[phone] fax    3/9/2023    HPI:   Ninoska Jain 2000 is a pleasant White (non-) female who presents virtually today for routine follow up. Todays concern/HPI:  1. DM. Not understanding why A1c went up. Changed diet. Eating smaller portions. Salads. Not wanting to start meds. 2. Depression. Wants to wean off zoloft and take mag and Vit d. Will reduce zoloft to 3d/w for now. 3. Cholesterol. Work on diet/exercise. Review of Systems - Negative except above      Past Medical History:   Diagnosis Date    COVID-19 vaccination declined 9/29/2021    Dyslipidemia 9/29/2021    Elevated blood pressure reading 5/27/2020    Essential hypertension 9/29/2021    History of elevated glucose 9/29/2021    Influenza vaccination declined 9/29/2021    Morbidly obese ([address] 5/27/2020    Oligomenorrhea 5/27/2020    Personal history of COVID-19 9/29/2021    Stressful life event affecting family 9/29/2021     History reviewed. No pertinent surgical history. Current Outpatient Medications   Medication Sig    sertraline (ZOLOFT) 50 MG tablet Take 1 tab by mouth Monday, Wednesday and Fridays    lisinopril (PRINIVIL;ZESTRIL) 20 MG tablet Take 1 tablet by mouth daily     No current facility-administered medications for this visit. Allergies and Intolerances:   No Known Allergies  Family History:   Family History   Problem Relation Age of Onset    Alcohol Abuse Mother     Heart Attack Paternal Grandmother     Other Mother         liver failure due to alcohol. Age at death unknown. Social History:   She  reports that she has never smoked. She has never used smokeless tobacco.   Social History     Substance and Sexual Activity   Alcohol Use Never     Immunization History: There is no immunization history on file for this patient.       Physical:   Exam:   Vital Signs: (As obtained by patient/caregiver at home)  There were not vitals taken for this visit. PHYSICAL EXAMINATION:  [ INSTRUCTIONS:  \"[x]\" Indicates a positive item  \"[]\" Indicates a negative item  -- DELETE ALL ITEMS NOT EXAMINED]      Constitutional: [x] Appears well-developed and well-nourished [x] No apparent distress      [] Abnormal -     Mental status: [x] Alert and awake  [x] Oriented to person/place/time [x] Able to follow commands    [] Abnormal -     Eyes:   EOM    [x]  Normal    [] Abnormal -   Sclera  [x]  Normal    [] Abnormal -          Discharge [x]  None visible   [] Abnormal -     HENT, Neck: [x] Normocephalic, atraumatic  [] Abnormal - . [x] Mouth/Throat: Mucous membranes are moist    Pulmonary/Chest: [x] Respiratory effort normal   [x] No visualized signs of difficulty breathing or respiratory distress        [] Abnormal -      Musculoskeletal:   [] Normal gait with no signs of ataxia         [] Normal range of motion of neck        [] Abnormal -did not assess        Skin:        [] No significant exanthematous lesions or discoloration noted on facial skin         [] Abnormal -did not assess           Psychiatric:       [x] Normal Affect [] Abnormal -        [] No Hallucinations    Review of Data:  Labs reviewed:   A1c 6.7-8.4  -no change from 6 months ago -154  TSH 3.76-4.81. Plan:  1. Essential hypertension  Assessment & Plan:   Well-controlled, continue current medications  Orders:  -     lisinopril (PRINIVIL;ZESTRIL) 20 MG tablet; Take 1 tablet by mouth daily, Disp-90 tablet, R-0Normal  2. Mixed hyperlipidemia  Assessment & Plan:   Uncontrolled, changes made today: Patient wishes to work on diet and exercise for the next 6 months before initiating medication. -no change from 6 months ago -154  3. Elevated TSH  Assessment & Plan:  TSH 3.76-4. 81. Asymptomatic  Not wanting to start med tx  [place] level 3m  Orders:  -     TSH + Free T4 Panel; Future  4.  Uncontrolled type 2 diabetes mellitus with hyperglycemia ([facility] [address]  Assessment & Plan:  A1c 6.7-8.4  Pt not wanting to start med tx  After much discussion, will hold off on med therapy  [place] level 3m  If A1c remains >7, will initiate therapy  Reduce carbs and sweets in diet  Orders:  -     Hemoglobin A1C; Future  5. Anxiety and depression  Assessment & Plan:  Pt wishes to wean off zoloft  Discussed pts sx are most likely under control due to med tx  after much discussion, will reduce therapy to 3x/w  Orders:  -     sertraline (ZOLOFT) 50 MG tablet; Take 1 tab by mouth Monday, Wednesday and Fridays, Disp-90 tablet, R-0Normal  1. Essential hypertension  Assessment & Plan:   Well-controlled, continue current medications  Orders:  -     lisinopril (PRINIVIL;ZESTRIL) 20 MG tablet; Take 1 tablet by mouth daily, Disp-90 tablet, R-0Normal  2. Mixed hyperlipidemia  Assessment & Plan:   Uncontrolled, changes made today: Patient wishes to work on diet and exercise for the next 6 months before initiating medication. -no change from 6 months ago -154  3. Elevated TSH  Assessment & Plan:  TSH 3.76-4. 81. Asymptomatic  Not wanting to start med tx  [place] level 3m  Orders:  -     TSH + Free T4 Panel; Future  4. Uncontrolled type 2 diabetes mellitus with hyperglycemia ([facility])  Assessment & Plan:  A1c 6.7-8.4  Pt not wanting to start med tx  After much discussion, will hold off on med therapy  [place] level 3m  If A1c remains >7, will initiate therapy  Reduce carbs and sweets in diet  Orders:  -     Hemoglobin A1C; Future  5. Anxiety and depression  Assessment & Plan:  Pt wishes to wean off zoloft  Discussed pts sx are most likely under control due to med tx  after much discussion, will reduce therapy to 3x/w  Orders:  -     sertraline (ZOLOFT) 50 MG tablet; Take 1 tab by mouth Monday, Wednesday and Fridays, Disp-90 tablet, R-0Normal      Reviewed medication and completed the medication reconciliation with the patient.  Reviewed side effects of medications with the patient. Questions were answered and patient verb understanding. informed patient chronic medication refills will not be given between their scheduled appointments; all refills will be given at the time of their scheduled follow-up appointments. Patient verbalized understanding      Return in about 3 months (around 6/9/2023) for Diabetes, Thy , Lab not fasting (A1c, TSH, T4) Virtual ok. Location:  Provider-in office  Patient: Home    JANNIE Kaufman, LION  [facility], who was evaluated through a synchronous (real-time) audio-video encounter, and/or her healthcare decision maker, is aware that it is a billable service, which includes applicable co-pays, with coverage as determined by her insurance carrier. She provided verbal consent to proceed and patient identification was verified. This visit was conducted pursuant to the emergency declaration under the [address] [place] [address], [address] [place] [address] authority and the "[employer] and [employer] General Act. A caregiver was present when appropriate. Ability to conduct physical exam was limited. The patient was located at: Home: [place] [id number]  [facility] [phone]  The provider was located at:  Facility (Appt Department): [facility] [address] [place] [phone]    --Azeb Dc DNP on 3/9/2023 at 3:55 PM

## 2023-03-09 NOTE — ASSESSMENT & PLAN NOTE
A1c 6.7-8.4  Pt not wanting to start med tx  After much discussion, will hold off on med therapy  [place] level 3m  If A1c remains >7, will initiate therapy  Reduce carbs and sweets in diet

## 2023-03-09 NOTE — ASSESSMENT & PLAN NOTE
Uncontrolled, changes made today: Patient wishes to work on diet and exercise for the next 6 months before initiating medication.    -no change from 6 months ago -154

## 2023-03-09 NOTE — PROGRESS NOTES
Josef Boyd presents today for   Chief Complaint   Patient presents with    Hypertension     6 month f/u    Discuss Labs     Completed 3/2/2023       1. \"Have you been to the ER, urgent care clinic since your last visit? Hospitalized since your last visit? \" NO    2. \"Have you seen or consulted any other health care providers outside of the [address] [place] [address] since your last visit? \" NO     3. For patients aged 39-70: Has the patient had a colonoscopy / FIT/ Cologuard? N/A      If the patient is female:    4. For patients aged 41-77: Has the patient had a mammogram within the past 2 years? N/a  See top three    5. For patients aged 21-65: Has the patient had a pap smear?  Yes

## 2023-03-09 NOTE — ASSESSMENT & PLAN NOTE
Pt not wanting to start med tx  -no change from 6 months ago -154  Reduce fried fatty or oily foods in diet  Limit red meats, processed foods, and alcohol.     Limit fast food  Work on weight reduction  Increase water intake  [place] level 6m

## 2023-03-13 ENCOUNTER — TELEPHONE (OUTPATIENT)
Age: 23
End: 2023-03-13

## 2023-05-18 ENCOUNTER — TELEPHONE (OUTPATIENT)
Age: 23
End: 2023-05-18

## 2023-05-18 DIAGNOSIS — F41.9 ANXIETY AND DEPRESSION: ICD-10-CM

## 2023-05-18 DIAGNOSIS — F32.A ANXIETY AND DEPRESSION: ICD-10-CM

## 2023-05-18 NOTE — TELEPHONE ENCOUNTER
Pt called stating she decreased her zoloft to 3 days a week .  She would like to go back to taking it 7 days a week  and she will need a refill    [address]

## 2023-05-18 NOTE — TELEPHONE ENCOUNTER
Zoloft 50 mg prescription sent in for 30-day supply to hold her until her 6/9/2023 appointment. We will discuss therapy treatment options at that time. ICD-10-CM    1. Anxiety and depression  F41.9 sertraline (ZOLOFT) 50 MG tablet    F32. A

## 2023-05-25 ENCOUNTER — NURSE ONLY (OUTPATIENT)
Age: 23
End: 2023-05-25

## 2023-05-25 DIAGNOSIS — E11.65 UNCONTROLLED TYPE 2 DIABETES MELLITUS WITH HYPERGLYCEMIA (HCC): ICD-10-CM

## 2023-05-25 DIAGNOSIS — R79.89 ELEVATED TSH: ICD-10-CM

## 2023-05-26 LAB
AVERAGE GLUCOSE: 179 MG/DL (ref 91–123)
HBA1C MFR BLD: 7.9 % (ref 4.8–5.6)
T4 FREE: 1.2 NG/DL (ref 0.9–1.8)
TSH SERPL DL<=0.05 MIU/L-ACNC: 2.49 MCU/ML (ref 0.27–4.2)

## 2023-06-08 ENCOUNTER — TELEMEDICINE (OUTPATIENT)
Age: 23
End: 2023-06-08
Payer: COMMERCIAL

## 2023-06-08 DIAGNOSIS — F41.9 ANXIETY AND DEPRESSION: ICD-10-CM

## 2023-06-08 DIAGNOSIS — E11.65 UNCONTROLLED TYPE 2 DIABETES MELLITUS WITH HYPERGLYCEMIA (HCC): Primary | ICD-10-CM

## 2023-06-08 DIAGNOSIS — I10 ESSENTIAL HYPERTENSION: ICD-10-CM

## 2023-06-08 DIAGNOSIS — E78.2 MIXED HYPERLIPIDEMIA: ICD-10-CM

## 2023-06-08 DIAGNOSIS — F32.A ANXIETY AND DEPRESSION: ICD-10-CM

## 2023-06-08 DIAGNOSIS — R79.89 ELEVATED TSH: ICD-10-CM

## 2023-06-08 PROCEDURE — 99213 OFFICE O/P EST LOW 20 MIN: CPT | Performed by: NURSE PRACTITIONER

## 2023-06-08 PROCEDURE — 3051F HG A1C>EQUAL 7.0%<8.0%: CPT | Performed by: NURSE PRACTITIONER

## 2023-06-08 RX ORDER — LISINOPRIL 20 MG/1
20 TABLET ORAL DAILY
Qty: 90 TABLET | Refills: 1 | Status: SHIPPED | OUTPATIENT
Start: 2023-06-08

## 2023-06-08 ASSESSMENT — PATIENT HEALTH QUESTIONNAIRE - PHQ9
1. LITTLE INTEREST OR PLEASURE IN DOING THINGS: 0
SUM OF ALL RESPONSES TO PHQ QUESTIONS 1-9: 0
6. FEELING BAD ABOUT YOURSELF - OR THAT YOU ARE A FAILURE OR HAVE LET YOURSELF OR YOUR FAMILY DOWN: 0
10. IF YOU CHECKED OFF ANY PROBLEMS, HOW DIFFICULT HAVE THESE PROBLEMS MADE IT FOR YOU TO DO YOUR WORK, TAKE CARE OF THINGS AT HOME, OR GET ALONG WITH OTHER PEOPLE: 0
3. TROUBLE FALLING OR STAYING ASLEEP: 0
5. POOR APPETITE OR OVEREATING: 0
SUM OF ALL RESPONSES TO PHQ QUESTIONS 1-9: 0
7. TROUBLE CONCENTRATING ON THINGS, SUCH AS READING THE NEWSPAPER OR WATCHING TELEVISION: 0
SUM OF ALL RESPONSES TO PHQ9 QUESTIONS 1 & 2: 0
SUM OF ALL RESPONSES TO PHQ QUESTIONS 1-9: 0
SUM OF ALL RESPONSES TO PHQ QUESTIONS 1-9: 0
4. FEELING TIRED OR HAVING LITTLE ENERGY: 0
2. FEELING DOWN, DEPRESSED OR HOPELESS: 0
9. THOUGHTS THAT YOU WOULD BE BETTER OFF DEAD, OR OF HURTING YOURSELF: 0
8. MOVING OR SPEAKING SO SLOWLY THAT OTHER PEOPLE COULD HAVE NOTICED. OR THE OPPOSITE, BEING SO FIGETY OR RESTLESS THAT YOU HAVE BEEN MOVING AROUND A LOT MORE THAN USUAL: 0

## 2023-06-08 ASSESSMENT — ANXIETY QUESTIONNAIRES
6. BECOMING EASILY ANNOYED OR IRRITABLE: 0
GAD7 TOTAL SCORE: 1
IF YOU CHECKED OFF ANY PROBLEMS ON THIS QUESTIONNAIRE, HOW DIFFICULT HAVE THESE PROBLEMS MADE IT FOR YOU TO DO YOUR WORK, TAKE CARE OF THINGS AT HOME, OR GET ALONG WITH OTHER PEOPLE: NOT DIFFICULT AT ALL
4. TROUBLE RELAXING: 0
3. WORRYING TOO MUCH ABOUT DIFFERENT THINGS: 0
1. FEELING NERVOUS, ANXIOUS, OR ON EDGE: 1
2. NOT BEING ABLE TO STOP OR CONTROL WORRYING: 0
7. FEELING AFRAID AS IF SOMETHING AWFUL MIGHT HAPPEN: 0
5. BEING SO RESTLESS THAT IT IS HARD TO SIT STILL: 0

## 2023-06-08 NOTE — ASSESSMENT & PLAN NOTE
Borderline controlled, lifestyle modifications recommended   A1c 8.4-7.9  Patient is doing well with reducing her A1c  Continue to work on diet and exercise  We will recheck level 6 months

## 2023-06-08 NOTE — PROGRESS NOTES
R-1Normal  4. Elevated TSH  Assessment & Plan:  Level has improved  TSH 4.81-2.49  No further intervention        Medication reconciliation completed. Return in about 6 months (around 12/8/2023) for Physical, Lab fasting (A1c, microalbumin, BMP, lipid). Location:  Provider-in office  Patient: work    Dr. Willy Ogden, JANNIE, LION  [facility], who was evaluated through a synchronous (real-time) audio-video encounter, and/or her healthcare decision maker, is aware that it is a billable service, which includes applicable co-pays, with coverage as determined by her insurance carrier. She provided verbal consent to proceed and patient identification was verified. This visit was conducted pursuant to the emergency declaration under the [address] [place] [address], [address] [place] [address] authority and the [employer] and [employer] General Act. A caregiver was present when appropriate. Ability to conduct physical exam was limited. The patient was located at: Home: [place] [phone]  The provider was located at:  Facility (Appt Department): [facility] [address] [place] [phone]    --Ericka Garcia DNP on 6/8/2023 at 12:52 PM

## 2023-06-09 DIAGNOSIS — I10 ESSENTIAL HYPERTENSION: ICD-10-CM

## 2023-06-12 RX ORDER — LISINOPRIL 20 MG/1
TABLET ORAL
Qty: 90 TABLET | Refills: 1 | OUTPATIENT
Start: 2023-06-12

## 2023-06-12 NOTE — TELEPHONE ENCOUNTER
Medication was written on 6/8/2023 for 6-month supply. Please note if patient already has refills so not to send refill request that are not appropriate.   Thank you

## 2023-10-12 ENCOUNTER — TELEPHONE (OUTPATIENT)
Age: 23
End: 2023-10-12

## 2023-10-12 NOTE — TELEPHONE ENCOUNTER
[facility]    Patient is scheduled for labs 12/4 at 3:30 and needs to be changed to earlier time due to the lab schedule.

## 2023-11-20 DIAGNOSIS — E78.2 MIXED HYPERLIPIDEMIA: ICD-10-CM

## 2023-12-13 LAB
ANION GAP SERPL CALCULATED.3IONS-SCNC: 10 MMOL/L (ref 3–15)
AVERAGE GLUCOSE: 196 MG/DL (ref 91–123)
BUN BLDV-MCNC: 10 MG/DL (ref 6–22)
CALCIUM SERPL-MCNC: 9.5 MG/DL (ref 8.4–10.5)
CHLORIDE BLD-SCNC: 103 MMOL/L (ref 98–110)
CHOLESTEROL/HDL RATIO: 6 (ref 0–5)
CHOLESTEROL: 234 MG/DL (ref 110–200)
CO2: 26 MMOL/L (ref 20–32)
CREAT SERPL-MCNC: 0.5 MG/DL (ref 0.5–1.2)
CREATININE URINE: 194 MG/DL
GLOMERULAR FILTRATION RATE: >60 ML/MIN/1.73 SQ.M.
GLUCOSE: 200 MG/DL (ref 70–99)
HBA1C MFR BLD: 8.4 % (ref 4.8–5.6)
HDLC SERPL-MCNC: 39 MG/DL
LDL CHOLESTEROL CALCULATED: 149 MG/DL (ref 50–99)
LDL/HDL RATIO: 3.8
MICROALB/CREAT RATIO (UG/MG CREAT.): 15.3 (ref 0–30)
MICROALBUMIN/CREAT 24H UR: 29.7 MG/L (ref 0.1–17)
NON-HDL CHOLESTEROL: 195 MG/DL
POTASSIUM SERPL-SCNC: 4.4 MMOL/L (ref 3.5–5.5)
SODIUM BLD-SCNC: 139 MMOL/L (ref 133–145)
TRIGL SERPL-MCNC: 229 MG/DL (ref 40–149)
VLDLC SERPL CALC-MCNC: 46 MG/DL (ref 8–30)

## 2023-12-19 PROBLEM — R80.9 TYPE 2 DIABETES MELLITUS WITH MICROALBUMINURIA, WITHOUT LONG-TERM CURRENT USE OF INSULIN (HCC): Status: RESOLVED | Noted: 2023-12-19 | Resolved: 2023-12-19

## 2023-12-19 PROBLEM — E11.65 UNCONTROLLED TYPE 2 DIABETES MELLITUS WITH HYPERGLYCEMIA (HCC): Status: RESOLVED | Noted: 2022-03-11 | Resolved: 2023-12-19

## 2023-12-19 PROBLEM — E11.29 TYPE 2 DIABETES MELLITUS WITH MICROALBUMINURIA, WITHOUT LONG-TERM CURRENT USE OF INSULIN (HCC): Status: ACTIVE | Noted: 2023-12-19

## 2023-12-19 PROBLEM — E11.29 TYPE 2 DIABETES MELLITUS WITH MICROALBUMINURIA, WITHOUT LONG-TERM CURRENT USE OF INSULIN (HCC): Status: RESOLVED | Noted: 2023-12-19 | Resolved: 2023-12-19

## 2023-12-19 PROBLEM — R80.9 TYPE 2 DIABETES MELLITUS WITH MICROALBUMINURIA, WITHOUT LONG-TERM CURRENT USE OF INSULIN (HCC): Status: ACTIVE | Noted: 2023-12-19

## 2023-12-19 PROBLEM — R79.89 ELEVATED TSH: Status: RESOLVED | Noted: 2022-08-31 | Resolved: 2023-12-19

## 2023-12-19 PROBLEM — Z00.00 ANNUAL PHYSICAL EXAM: Status: ACTIVE | Noted: 2023-12-19

## 2023-12-19 PROBLEM — E11.21 TYPE 2 DIABETES MELLITUS WITH DIABETIC NEPHROPATHY, WITHOUT LONG-TERM CURRENT USE OF INSULIN (HCC): Status: ACTIVE | Noted: 2023-12-19

## 2023-12-28 ENCOUNTER — TELEPHONE (OUTPATIENT)
Age: 23
End: 2023-12-28

## 2023-12-28 NOTE — TELEPHONE ENCOUNTER
----- Message from Ariana Chavis sent at 12/27/2023  3:11 PM EST -----  Subject: Referral Request    Reason for referral request? wants a referral to a gynocologist  Provider patient wants to be referred to(if known):     Provider Phone Number(if known):[phone]    Additional Information for Provider? fax# [phone]  ---------------------------------------------------------------------------  --------------  CALL BACK INFO    [phone]; OK to leave message on voicemail  ---------------------------------------------------------------------------  --------------

## 2024-01-18 PROBLEM — Z00.00 ANNUAL PHYSICAL EXAM: Status: RESOLVED | Noted: 2023-12-19 | Resolved: 2024-01-18

## 2024-01-31 DIAGNOSIS — I10 ESSENTIAL HYPERTENSION: ICD-10-CM

## 2024-02-01 RX ORDER — LISINOPRIL 20 MG/1
20 TABLET ORAL DAILY
Qty: 93 TABLET | Refills: 0 | OUTPATIENT
Start: 2024-02-01

## 2024-03-12 DIAGNOSIS — E11.29 TYPE 2 DIABETES MELLITUS WITH MICROALBUMINURIA, WITHOUT LONG-TERM CURRENT USE OF INSULIN (HCC): ICD-10-CM

## 2024-03-12 DIAGNOSIS — E78.2 MIXED HYPERLIPIDEMIA: ICD-10-CM

## 2024-03-12 DIAGNOSIS — I10 ESSENTIAL HYPERTENSION: ICD-10-CM

## 2024-03-12 DIAGNOSIS — R80.9 TYPE 2 DIABETES MELLITUS WITH MICROALBUMINURIA, WITHOUT LONG-TERM CURRENT USE OF INSULIN (HCC): ICD-10-CM

## 2024-03-14 ENCOUNTER — TELEPHONE (OUTPATIENT)
Age: 24
End: 2024-03-14

## 2024-03-14 DIAGNOSIS — E11.29 TYPE 2 DIABETES MELLITUS WITH OTHER DIABETIC KIDNEY COMPLICATION (HCC): ICD-10-CM

## 2024-03-14 DIAGNOSIS — E78.2 MIXED HYPERLIPIDEMIA: ICD-10-CM

## 2024-03-14 NOTE — TELEPHONE ENCOUNTER
Last OV: 12/19/23  Next OV: 03/19/24  Last RX: 12/19/23-metformin                               -atorvastatin      Please refuse. Pt upcoming appt 3/19/24.

## 2024-03-15 ENCOUNTER — TELEPHONE (OUTPATIENT)
Age: 24
End: 2024-03-15

## 2024-03-15 RX ORDER — ATORVASTATIN CALCIUM 20 MG/1
20 TABLET, FILM COATED ORAL DAILY
Qty: 90 TABLET | Refills: 1 | OUTPATIENT
Start: 2024-03-15

## 2024-03-15 NOTE — TELEPHONE ENCOUNTER
----- Message from Traci Velez sent at 3/15/2024 12:39 PM EDT -----  Subject: Message to Provider    QUESTIONS  Information for Provider? please call patient to schedule labs on or   around the the 3/19  ---------------------------------------------------------------------------  --------------  CALL BACK INFO  [phone]; OK to leave message on voicemail  ---------------------------------------------------------------------------  --------------  SCRIPT ANSWERS  Relationship to Patient? Self

## 2024-03-17 DIAGNOSIS — E78.2 MIXED HYPERLIPIDEMIA: ICD-10-CM

## 2024-03-18 RX ORDER — ATORVASTATIN CALCIUM 20 MG/1
20 TABLET, FILM COATED ORAL DAILY
Qty: 30 TABLET | Refills: 0 | Status: SHIPPED | OUTPATIENT
Start: 2024-03-18

## 2024-03-21 ENCOUNTER — TELEPHONE (OUTPATIENT)
Age: 24
End: 2024-03-21

## 2024-03-21 DIAGNOSIS — I10 ESSENTIAL HYPERTENSION: ICD-10-CM

## 2024-03-21 DIAGNOSIS — E78.2 MIXED HYPERLIPIDEMIA: ICD-10-CM

## 2024-03-21 DIAGNOSIS — E11.29 TYPE 2 DIABETES MELLITUS WITH MICROALBUMINURIA, WITHOUT LONG-TERM CURRENT USE OF INSULIN (HCC): Primary | ICD-10-CM

## 2024-03-21 DIAGNOSIS — R80.9 TYPE 2 DIABETES MELLITUS WITH MICROALBUMINURIA, WITHOUT LONG-TERM CURRENT USE OF INSULIN (HCC): Primary | ICD-10-CM

## 2024-03-21 RX ORDER — LISINOPRIL 20 MG/1
20 TABLET ORAL DAILY
Qty: 93 TABLET | Refills: 0 | Status: SHIPPED | OUTPATIENT
Start: 2024-03-21

## 2024-03-21 NOTE — TELEPHONE ENCOUNTER
I sent in a 30-day refill for Lipitor on 3/18/2024.  Her Zoloft refills are good until December 2024.  I will refill her lisinopril and metformin.  Have her call and schedule appointment when she obtains her medical insurance next month.  Thank you    Requested Prescriptions     Signed Prescriptions Disp Refills    lisinopril (PRINIVIL;ZESTRIL) 20 MG tablet 93 tablet 0     Sig: Take 1 tablet by mouth daily     Authorizing Provider: LUIS FERNANDO PENG    metFORMIN (GLUCOPHAGE) 500 MG tablet 186 tablet 0     Sig: Take 1 tablet by mouth 2 times daily (with meals)     Authorizing Provider: LUIS FERNANDO PENG

## 2024-03-21 NOTE — TELEPHONE ENCOUNTER
Pt called in and canceled labs and OV Pt does not have insurance at this time. Will have possibly next month with new job. Pt wants to know if she can get medication refill?    Pt states will call back and r/s appt.

## 2024-04-10 DIAGNOSIS — F32.A ANXIETY AND DEPRESSION: ICD-10-CM

## 2024-04-10 DIAGNOSIS — F41.9 ANXIETY AND DEPRESSION: ICD-10-CM

## 2024-04-28 DIAGNOSIS — R80.9 TYPE 2 DIABETES MELLITUS WITH MICROALBUMINURIA, WITHOUT LONG-TERM CURRENT USE OF INSULIN (HCC): ICD-10-CM

## 2024-04-28 DIAGNOSIS — E11.29 TYPE 2 DIABETES MELLITUS WITH MICROALBUMINURIA, WITHOUT LONG-TERM CURRENT USE OF INSULIN (HCC): ICD-10-CM

## 2024-04-29 NOTE — TELEPHONE ENCOUNTER
Patient currently does not have medical insurance.  Once she obtains medical insurance, she will schedule a follow-up appointment.

## 2024-05-20 DIAGNOSIS — I10 ESSENTIAL HYPERTENSION: ICD-10-CM

## 2024-05-20 RX ORDER — LISINOPRIL 20 MG/1
20 TABLET ORAL DAILY
Qty: 90 TABLET | Refills: 1 | OUTPATIENT
Start: 2024-05-20

## 2024-06-06 DIAGNOSIS — E78.2 MIXED HYPERLIPIDEMIA: ICD-10-CM

## 2024-06-06 RX ORDER — ATORVASTATIN CALCIUM 20 MG/1
TABLET, FILM COATED ORAL
Qty: 90 TABLET | Refills: 1 | OUTPATIENT
Start: 2024-06-06

## 2024-06-06 NOTE — TELEPHONE ENCOUNTER
Please call patient and inquire if she has obtained medical insurance.  She has had to cancel multiple appointments due to not having insurance.  She is calling for medication refills.  She needs to schedule a follow-up appointment.  Thank you

## 2024-06-10 DIAGNOSIS — E78.2 MIXED HYPERLIPIDEMIA: ICD-10-CM

## 2024-06-10 RX ORDER — ATORVASTATIN CALCIUM 20 MG/1
TABLET, FILM COATED ORAL
Qty: 30 TABLET | Refills: 0 | OUTPATIENT
Start: 2024-06-10

## 2024-06-24 DIAGNOSIS — I10 ESSENTIAL HYPERTENSION: ICD-10-CM

## 2024-06-24 RX ORDER — LISINOPRIL 20 MG/1
20 TABLET ORAL DAILY
Qty: 90 TABLET | Refills: 1 | OUTPATIENT
Start: 2024-06-24

## 2024-08-05 DIAGNOSIS — E11.29 TYPE 2 DIABETES MELLITUS WITH MICROALBUMINURIA, WITHOUT LONG-TERM CURRENT USE OF INSULIN (HCC): ICD-10-CM

## 2024-08-05 DIAGNOSIS — R80.9 TYPE 2 DIABETES MELLITUS WITH MICROALBUMINURIA, WITHOUT LONG-TERM CURRENT USE OF INSULIN (HCC): ICD-10-CM
